# Patient Record
Sex: FEMALE | Race: WHITE
[De-identification: names, ages, dates, MRNs, and addresses within clinical notes are randomized per-mention and may not be internally consistent; named-entity substitution may affect disease eponyms.]

---

## 2018-06-19 ENCOUNTER — HOSPITAL ENCOUNTER (OUTPATIENT)
Dept: HOSPITAL 68 - ERH | Age: 68
LOS: 1 days | End: 2018-06-20
Attending: SURGERY
Payer: COMMERCIAL

## 2018-06-19 VITALS — BODY MASS INDEX: 30.53 KG/M2 | HEIGHT: 66 IN | WEIGHT: 190 LBS

## 2018-06-19 DIAGNOSIS — I10: ICD-10-CM

## 2018-06-19 DIAGNOSIS — K21.9: ICD-10-CM

## 2018-06-19 DIAGNOSIS — K80.10: Primary | ICD-10-CM

## 2018-06-19 LAB
ABSOLUTE GRANULOCYTE CT: 5.2 /CUMM (ref 1.4–6.5)
BASOPHILS # BLD: 0.1 /CUMM (ref 0–0.2)
BASOPHILS NFR BLD: 0.7 % (ref 0–2)
EOSINOPHIL # BLD: 0.2 /CUMM (ref 0–0.7)
EOSINOPHIL NFR BLD: 2.5 % (ref 0–5)
ERYTHROCYTE [DISTWIDTH] IN BLOOD BY AUTOMATED COUNT: 13.5 % (ref 11.5–14.5)
GRANULOCYTES NFR BLD: 62.2 % (ref 42.2–75.2)
HCT VFR BLD CALC: 43.1 % (ref 37–47)
LYMPHOCYTES # BLD: 2.3 /CUMM (ref 1.2–3.4)
MCH RBC QN AUTO: 30 PG (ref 27–31)
MCHC RBC AUTO-ENTMCNC: 33.4 G/DL (ref 33–37)
MCV RBC AUTO: 89.8 FL (ref 81–99)
MONOCYTES # BLD: 0.6 /CUMM (ref 0.1–0.6)
PLATELET # BLD: 247 /CUMM (ref 130–400)
PMV BLD AUTO: 8.5 FL (ref 7.4–10.4)
RED BLOOD CELL CT: 4.8 /CUMM (ref 4.2–5.4)
WBC # BLD AUTO: 8.3 /CUMM (ref 4.8–10.8)

## 2018-06-19 PROCEDURE — C9290 INJ, BUPIVACAINE LIPOSOME: HCPCS

## 2018-06-19 NOTE — HISTORY & PHYSICAL PRE-OP
Sarah Savage 06/19/18 0876:
General Information and HPI
MD Statement:
I have seen and personally examined BETH HINOJOSA and documented this H&P.
 
The patient is a 67 year old F who presented with a patient stated chief 
complaint of [ABDOMINAL PAIN].
 
Source of Information: patient
Exam Limitations: no limitations
History of Present Illness:
This 67 year old female with past medical history including hypertension and 
gerd, presents with abdominal pain that started earlier today. She reports pain 
across her abdomen, with more severe pain along her right upper side. She denies
history of similar episodes of pain. No fevers / chills / or sweats. No 
shortness of breath. No chest pains. No recent illnesses. She is unaware of a 
personal history of gallstones, but admits her mother had gallstones and 
required her gallbladder removed.
 
Allergies/Medications
Allergies:
Coded Allergies:
nitrofurantoin (From MACROBID) (Mild, HIVES 06/30/16)
phenazopyridine (From PYRIDIUM) (Mild, HIVES 06/30/16)
 
 
Past History
 
Medical History
Neurological: NONE
EENT: NONE
Cardiovascular: hypertension
Respiratory: NONE
Gastrointestinal: GERD
Hepatic: NONE
Renal: NONE
Musculoskeletal: NONE
Psychiatric: NONE
Endocrine: NONE
Blood Disorders: NONE
Cancer(s): NONE
GYN/Reproductive: NONE
 
Surgical History
Pertinent Surgical History: hysterectomy
 
Past Family/Social History
 
Family History
Relations & Conditions if any
MOTHER
  FHx: atrial fibrillation
  FHx: gallstones
FATHER
  FHx: lung cancer
 
 
Psychosocial History
Where Do You Live? Home
Who Do You Live With? spouse
Services at Home None
Primary Language: English
Smoking Status: Former Smoker (5 pack/year history, 50yrs ago)
ETOH Use: occasional use
Illicit Drug Use: denies illicit drug use
 
Employment History
Employment: Employed
Profession/Employer: HR 
 
Review of Systems
Review of Systems:
admits: abdominal pain
 
denies: fevers / chills / sweats, shortness of breath, chest pains, dizziness, 
dysuria
 
Exam & Diagnostic Data
Last 24 Hrs of Vital Signs/I&O
 Vital Signs
 
 
Date Time Temp Pulse Resp B/P B/P Pulse O2 O2 Flow FiO2
 
     Mean Ox Delivery Rate 
 
06/19 2051 96.4 80 18 167/77  98 Room Air  
 
 
 
Physical Exam:
General - alert & oriented x 3. comfortable. no acute distress.
skin - warm, dry, and smooth. no rashes. no jaundice appreciated.
Lungs - clear bilaterally. no w/r/r.
Cardiac - s1s2. reg.
Abdomen - soft. right upper quadrant tenderness appreciated with +murphys sign.
Extremities - warm bilaterally. trace edema bilateral ankles. calves soft and 
nontender. nvi.
Neuro - speech smooth and coordinated. no focal deficits.
Last 24 Hrs of Labs/Arturo:
 Laboratory Tests
 
06/19/18 2232:
Urine Color STRAW, Urine Clarity CLEAR, Urine pH 6.0, Ur Specific Gravity <= 
1.005, Urine Protein NEG, Urine Ketones NEG, Urine Nitrite NEG, Urine Bilirubin 
NEG, Urine Urobilinogen 0.2, Ur Leukocyte Esterase NEG, Ur Microscopic SEDIMENT 
EXAMINED, Urine RBC RARE, Urine WBC RARE, Ur Epithelial Cells RARE, Urine 
Bacteria RARE  H, Urine Hemoglobin TRACE-INTACT, Urine Glucose NEG
 
06/19/18 2131:
Anion Gap 10, Estimated GFR > 60, BUN/Creatinine Ratio 28.8  H, Glucose 107  H, 
Calcium 9.9, Total Bilirubin 0.5, AST 28, ALT 27, Alkaline Phosphatase 47, 
Troponin I < 0.01, Total Protein 7.1, Albumin 4.2, Globulin 2.9, Albumin/
Globulin Ratio 1.4, Amylase 63, Lipase 154, CBC w Diff NO MAN DIFF REQ, RBC 4.80
, MCV 89.8, MCH 30.0, MCHC 33.4, RDW 13.5, MPV 8.5, Gran % 62.2, Lymphocytes % 
27.9, Monocytes % 6.7, Eosinophils % 2.5, Basophils % 0.7, Absolute Granulocytes
5.2, Absolute Lymphocytes 2.3, Absolute Monocytes 0.6, Absolute Eosinophils 0.2,
Absolute Basophils 0.1
 
 
Diagnostic Data
Other Results
EXAM TYPE: CAT - CT ABD & PELVIS W IV CONTRAST
 
EXAMINATION:
CT ABDOMEN AND PELVIS WITH CONTRAST
 
CLINICAL INFORMATION:
Right upper quadrant abdominal pain. Evaluate for cholecystitis.
 
COMPARISON:
MRI abdomen 07/21/2017.
 
TECHNIQUE:
Multidetector volumetric imaging was performed of the abdomen and pelvis
following IV administration of 95 mL of Optiray 320 intravenous contrast.
Sagittal and coronal reformatted images were obtained on the technologist's
workstation.
 
DLP:
512.96 mGy-cm
 
FINDINGS:
 
LUNG BASES: There is bibasilar subsegmental atelectasis. No pleural or
pericardial effusion.
 
LIVER, GALLBLADDER, AND BILIARY TREE: Liver attenuation is homogeneous with
no evidence of a discrete hepatic parenchymal mass. Grossly no intrahepatic
or extrahepatic biliary ductal dilatation. The gallbladder is distended with
a fundal diameter of 4.8 cm. There is suspected to be mild gallbladder wall
thickening for instance best illustrated on axial image 34 of 103 series 2. A
few ill-defined calculi are visualized layering within the gallbladder neck.
 
 
PANCREAS: Unremarkable.
 
SPLEEN: Unremarkable.
 
ADRENAL GLANDS: Unremarkable.
 
KIDNEYS AND URETERS: Kidneys demonstrate symmetric corticomedullary
enhancement characteristics. There is no discrete renal parenchymal mass. No
abnormal perinephric inflammation or collection. No hydronephrosis. No
abnormal mass or calcification is visualized along the expected course of the
right or left ureter.
 
BLADDER: Unremarkable.
 
GASTROINTESTINAL TRACT: There is a small sliding hiatal hernia. Stomach is
physiologically distended with ingested material. Small bowel is
unremarkable. Grossly no evidence of obstruction. No free intraperitoneal air
or fluid. There is slightly increased attenuation within the mesenteric fat
that appears grossly stable when compared to prior imaging as well as a few
scattered nonspecific mesenteric lymph nodes. The appendix is normal. A few
diverticula are visualized primarily within the distal descending and sigmoid
colon. No evidence of acute diverticulitis.
 
ABDOMINAL WALL: No significant hernia is appreciated.
 
VASCULAR: The abdominal aorta and inferior vena cava are grossly unremarkable.
 
PELVIC VISCERA: The uterus is not visualized. No abnormal adnexal mass.
 
OSSEOUS STRUCTURES: There is no acute osseous finding. No evidence of acute
fracture and no spinal subluxation.
 
IMPRESSION:
There are a few ill-defined calculi that are layering within the gallbladder
neck and the gallbladder is grossly distended with a fundal diameter of 4.8
cm. There is suspected gallbladder wall thickening that supports the clinical
suspicion of acute cholecystitis. The sensitivity and specificity of CT for
the identification of acute cholecystitis is however relatively poor
therefore a right upper quadrant ultrasound is recommended for better
anatomic characterization.
 
Otherwise stable examination. Specifically the appearance of the mesenteric
fat with a few scattered nonspecific mesenteric lymph nodes remains unchanged
when compared to prior imaging. A few scattered diverticula are visualized
within the descending and sigmoid colon. No evidence of acute diverticulitis.
 
DICTATED BY: Cindy GARAY,Cornelius BUENO 
DATE/TIME DICTATED:06/19/18 / 2226
:RAD.FAY 
DATE/TIME TRANSCRIBED:06/19/18 / 2226
 
 
Assessment/Plan
Assessment/Plan:
This 67 year old female with history of htn, and gerd, presents with acute 
calculous cholecystitis
 
npo after midnight
pain control as needed
iv unasyn q6
protonix - gi ppx
hep sc post-op tomorrow
losartan / hctz ordered
OR tomorrow for laparoscopic cholecystectomy by 
observation status given possibility of discharge to home after surgery
 
As Ranked By This Provider
Problem List:
 1. Cholecystitis
 
 2. GERD (gastroesophageal reflux disease)
 
 3. Hypertension
 
 4. Gallstones
 
Copies To:
Rama GARAY,Chemo Rodriguez Cornell OLIVERTimoteo 06/25/18 9541:
General Information and HPI
 
Allergies/Medications
Home Med list
Ascorbic Acid (Vitamin C) 500 MG CAPSULE.ER   1 CAP PO DAILY SUPPLEMENT  (
Reported)
Aspirin (Aspirin*) 81 MG TAB.CHEW   1 TAB PO DAILY HEART  (Reported)
Calcium Carbonate/Vitamin D3 (Calcium 500 + D Tablet) 1 EACH TABLET   1 TAB PO 
BID SUPPL  (Reported)
Lactobacillus Acidophilus (Probiotic) 1 EACH CAPSULE   1 CAP PO D 0800  (
Reported)
Losartan/Hydrochlorothiazide (Losartan-Hctz 50-12.5 MG Tab) 1 EACH TABLET   1 
TAB PO DAILY HTN  (Reported)
Multivitamin (Daily Multiple Vitamin) 1 EACH TABLET   1 TAB PO DAILY SUPPLE  (
Reported)
Oxycodone HCl/Acetaminophen (Percocet 5-325 MG Tablet) 5 MG-325 MG TABLET   1-2 
TAB PO Q4-6 PRN PRN pain any scale
     tylenol alternatively. do not combine.
Vitamin B Complex & Vit C No.3 (Balanced B Complex-Vit C) 1 EACH TABLET.ER   1 
TAB PO BID SUPPLEMENT  (Reported)
 
 
 
Attending MD Review Statement
 
Attending Statement
Attending MD Statement: discuss w/resident/PA/NP, agreed w/resident/PA/NP, 
discussed with family, reviewed images

## 2018-06-19 NOTE — CT SCAN REPORT
EXAMINATION:
CT ABDOMEN AND PELVIS WITH CONTRAST
 
CLINICAL INFORMATION:
Right upper quadrant abdominal pain. Evaluate for cholecystitis.
 
COMPARISON:
MRI abdomen 07/21/2017.
 
TECHNIQUE:
Multidetector volumetric imaging was performed of the abdomen and pelvis
following IV administration of 95 mL of Optiray 320 intravenous contrast.
Sagittal and coronal reformatted images were obtained on the technologist's
workstation.
 
DLP:
512.96 mGy-cm
 
FINDINGS:
 
LUNG BASES: There is bibasilar subsegmental atelectasis. No pleural or
pericardial effusion.
 
LIVER, GALLBLADDER, AND BILIARY TREE: Liver attenuation is homogeneous with
no evidence of a discrete hepatic parenchymal mass. Grossly no intrahepatic
or extrahepatic biliary ductal dilatation. The gallbladder is distended with
a fundal diameter of 4.8 cm. There is suspected to be mild gallbladder wall
thickening for instance best illustrated on axial image 34 of 103 series 2. A
few ill-defined calculi are visualized layering within the gallbladder neck.
 
 
PANCREAS: Unremarkable.
 
SPLEEN: Unremarkable.
 
ADRENAL GLANDS: Unremarkable.
 
KIDNEYS AND URETERS: Kidneys demonstrate symmetric corticomedullary
enhancement characteristics. There is no discrete renal parenchymal mass. No
abnormal perinephric inflammation or collection. No hydronephrosis. No
abnormal mass or calcification is visualized along the expected course of the
right or left ureter.
 
BLADDER: Unremarkable.
 
GASTROINTESTINAL TRACT: There is a small sliding hiatal hernia. Stomach is
physiologically distended with ingested material. Small bowel is
unremarkable. Grossly no evidence of obstruction. No free intraperitoneal air
or fluid. There is slightly increased attenuation within the mesenteric fat
that appears grossly stable when compared to prior imaging as well as a few
scattered nonspecific mesenteric lymph nodes. The appendix is normal. A few
diverticula are visualized primarily within the distal descending and sigmoid
colon. No evidence of acute diverticulitis.
 
ABDOMINAL WALL: No significant hernia is appreciated.
 
VASCULAR: The abdominal aorta and inferior vena cava are grossly unremarkable.
 
PELVIC VISCERA: The uterus is not visualized. No abnormal adnexal mass.
 
OSSEOUS STRUCTURES: There is no acute osseous finding. No evidence of acute
fracture and no spinal subluxation.
 
IMPRESSION:
There are a few ill-defined calculi that are layering within the gallbladder
neck and the gallbladder is grossly distended with a fundal diameter of 4.8
cm. There is suspected gallbladder wall thickening that supports the clinical
suspicion of acute cholecystitis. The sensitivity and specificity of CT for
the identification of acute cholecystitis is however relatively poor
therefore a right upper quadrant ultrasound is recommended for better
anatomic characterization.
 
Otherwise stable examination. Specifically the appearance of the mesenteric
fat with a few scattered nonspecific mesenteric lymph nodes remains unchanged
when compared to prior imaging. A few scattered diverticula are visualized
within the descending and sigmoid colon. No evidence of acute diverticulitis.

## 2018-06-19 NOTE — ED GI/GU/ABDOMINAL COMPLAINT
History of Present Illness
 
General
Chief Complaint: Abdominal Pain/Flank Pain
Stated Complaint: "PAIN ACROSS MY STOMACH"
Source: patient, family, old records
Exam Limitations: no limitations
 
Vital Signs & Intake/Output
Vital Signs & Intake/Output
 Vital Signs
 
 
Date Time Temp Pulse Resp B/P B/P Pulse O2 O2 Flow FiO2
 
     Mean Ox Delivery Rate 
 
2051 96.4 80 18 167/77  98 Room Air  
 
 
 
Allergies
Coded Allergies:
nitrofurantoin (From MACROBID) (Mild, HIVES 16)
phenazopyridine (From PYRIDIUM) (Mild, HIVES 16)
 
Reconcile Medications
Amoxicillin/Potassium Clav (Augmentin 875-125 Tablet) 1 EACH TABLET   1 TAB PO 
BID SINUSITIS
Ascorbic Acid (Vitamin C) 500 MG CAPSULE.ER   1 CAP PO DAILY SUPPLEMENT  (
Reported)
Aspirin (Aspirin*) 81 MG TAB.CHEW   1 TAB PO DAILY HEART  (Reported)
Calcium Carbonate/Vitamin D3 (Calcium 500 + D Tablet) 1 EACH TABLET   1 TAB PO 
BID SUPPL  (Reported)
Lactobacillus Acidophilus (Probiotic) 1 EACH CAPSULE   1 CAP PO D 0800  (
Reported)
Losartan/Hydrochlorothiazide (Losartan-Hctz 50-12.5 MG Tab) 1 EACH TABLET   1 
TAB PO DAILY HTN  (Reported)
Multivitamin (Daily Multiple Vitamin) 1 EACH TABLET   1 TAB PO DAILY SUPPLE  (
Reported)
Sulfamethoxazole/Trimethoprim (Sulfamethoxazole-Tmp Ds Tablet) 1 EACH TABLET   1
TAB PO BID 0800  (Reported)
Vitamin B Complex & Vit C No.3 (Balanced B Complex-Vit C) 1 EACH TABLET.ER   1 
TAB PO BID SUPPLEMENT  (Reported)
 
Triage Note:
PT TO TRIAGE C/O PAIN ACROSS CENTER OF ABD ALL
 DAY, RELIEVED WITH TUMS, BUT RETURNED AFTER EATING
 DINNER. PT STATES AT LUNCH HAD PASTA WITH TOMATOES
 FROM A RESTAURANT. DENIES N/V/D. STATES HAD BM
 TODAY, NORMAL PER PT. DENIES URINARY S/SX.
Triage Nurses Notes Reviewed? yes
Pregnant? N
Is pt currently breastfeeding? No
HPI:
Shortly after having lunch patient developed a pain in her right upper quadrant.
 The pain then began to radiate across to her left upper quadrant.  Patient was 
slightly nauseous however that has resolved.  The pain worsened after eating 
dinner.  Subsequently the pain has left her left upper quadrant and is currently
only in the right upper quadrant.  The pain is also improved however it is still
present.  At its worst the pain was 8 out of 10 and currently is a 4 out of 10. 
She is no longer nauseous.  There are no fevers or chills.  There is no 
diarrhea.  The pain is cramping in nature.
 
Past History
 
Travel History
Traveled to [place] past 21 day No
 
Medical History
Any Pertinent Medical History? see below for history
Neurological: NONE
EENT: NONE
Cardiovascular: hypertension
Respiratory: NONE
Gastrointestinal: NONE
Hepatic: NONE
Renal: NONE
Musculoskeletal: NONE
Psychiatric: NONE
Endocrine: NONE
Blood Disorders: NONE
Cancer(s): NONE
GYN/Reproductive: NONE
 
Surgical History
Surgical History: non-contributory
 
Psychosocial History
What is your primary language English
Tobacco Use: Never used
ETOH Use: occasional use
Illicit Drug Use: denies illicit drug use
 
Family History
Hx Contributory? No
 
Review of Systems
 
Review of Systems
Constitutional:
Reports: no symptoms. 
EENTM:
Reports: no symptoms. 
Respiratory:
Reports: no symptoms. 
Cardiovascular:
Reports: no symptoms. 
GI:
Reports: see HPI, abdominal pain. 
Genitourinary:
Reports: no symptoms. 
Musculoskeletal:
Reports: no symptoms. 
Skin:
Reports: no symptoms. 
Neurological/Psychological:
Reports: no symptoms. 
Hematologic/Endocrine:
Reports: no symptoms. 
Immunologic/Allergic:
Reports: no symptoms. 
All Other Systems: Reviewed and Negative
 
Physical Exam
 
Physical Exam
General Appearance: well developed/nourished, alert, awake, anxious, mild 
distress
Head: atraumatic, normal appearance
Eyes:
Bilateral: PERRL, EOMI. 
Ears, Nose, Throat, Mouth: hearing grossly normal, moist mucous membrane
Neck: normal inspection, supple, full range of motion
Respiratory: normal breath sounds, chest non-tender, no respiratory distress, 
lungs clear
Cardiovascular: regular rate/rhythm, normal peripheral pulses
Gastrointestinal: normal bowel sounds, soft, no organomegaly, tenderness (RUQ), 
POSITIVE LUIS'S
Back: normal inspection, normal range of motion
Extremities: normal range of motion
Neurologic/Psych: no motor/sensory deficits, awake, alert, oriented x 3, normal 
gait, normal mood/affect
Skin: intact, normal color, warm/dry
 
Core Measures
ACS in differential dx? No
Sepsis Present: No
Sepsis Focused Exam Completed? No
 
Progress
Differential Diagnosis: AMI, biliary colic, cholecystitis, gastritis, hepatitis,
ischemic bowel, inflamm bowel dis, pancreatitis
Plan of Care:
 Orders
 
 
Procedure Date/time Status
 
URINALYSIS 2058 Complete
 
TROPONIN LEVEL 2058 Complete
 
LIPASE 2058 Complete
 
COMPREHENSIVE METABOLIC PANEL 2058 Complete
 
CBC WITHOUT DIFFERENTIAL 2058 Complete
 
AMYLASE 2058 Complete
 
EKG 2058 Active
 
 
 Laboratory Tests
 
 
18 2232:
Urine Color STRAW, Urine Clarity CLEAR, Urine pH 6.0, Ur Specific Gravity <= 
1.005, Urine Protein NEG, Urine Ketones NEG, Urine Nitrite NEG, Urine Bilirubin 
NEG, Urine Urobilinogen 0.2, Ur Leukocyte Esterase NEG, Ur Microscopic SEDIMENT 
EXAMINED, Urine RBC RARE, Urine WBC RARE, Ur Epithelial Cells RARE, Urine 
Bacteria RARE  H, Urine Hemoglobin TRACE-INTACT, Urine Glucose NEG
 
18 2131:
Anion Gap 10, Estimated GFR > 60, BUN/Creatinine Ratio 28.8  H, Glucose 107  H, 
Calcium 9.9, Total Bilirubin 0.5, AST 28, ALT 27, Alkaline Phosphatase 47, 
Troponin I < 0.01, Total Protein 7.1, Albumin 4.2, Globulin 2.9, Albumin/
Globulin Ratio 1.4, Amylase 63, Lipase 154, CBC w Diff NO MAN DIFF REQ, RBC 4.80
, MCV 89.8, MCH 30.0, MCHC 33.4, RDW 13.5, MPV 8.5, Gran % 62.2, Lymphocytes % 
27.9, Monocytes % 6.7, Eosinophils % 2.5, Basophils % 0.7, Absolute Granulocytes
5.2, Absolute Lymphocytes 2.3, Absolute Monocytes 0.6, Absolute Eosinophils 0.2,
Absolute Basophils 0.1
 
Diagnostic Imaging:
Viewed by Me: CT Scan.  Discussed w/RAD: CT Scan. 
Radiology Impression: PATIENT: BETH HINOJOSA  MEDICAL RECORD NO: [id number] PRESENT
AGE: 67  PATIENT ACCOUNT NO: [id number] : [birth year]  LOCATION: [facility] ORDERING 
PHYSICIAN: Meek Duke MD     SERVICE DATE:  EXAM TYPE: CAT 
- CT ABD & PELVIS W IV CONTRAST EXAMINATION: CT ABDOMEN AND PELVIS WITH CONTRAST
CLINICAL INFORMATION: Right upper quadrant abdominal pain. Evaluate for 
cholecystitis. COMPARISON: MRI abdomen 2017. TECHNIQUE: Multidetector 
volumetric imaging was performed of the abdomen and pelvis following IV 
administration of 95 mL of Optiray 320 intravenous contrast. Sagittal and 
coronal reformatted images were obtained on the technologist's workstation. DLP:
512.96 mGy-cm FINDINGS: LUNG BASES: There is bibasilar subsegmental atelectasis.
No pleural or pericardial effusion. LIVER, GALLBLADDER, AND BILIARY TREE: Liver 
attenuation is homogeneous with no evidence of a discrete hepatic parenchymal 
mass. Grossly no intrahepatic or extrahepatic biliary ductal dilatation. The 
gallbladder is distended with a fundal diameter of 4.8 cm. There is suspected to
be mild gallbladder wall thickening for instance best illustrated on axial image
34 of 103 series 2. A few ill-defined calculi are visualized layering within the
gallbladder neck. PANCREAS: Unremarkable. SPLEEN: Unremarkable. ADRENAL GLANDS: 
Unremarkable. KIDNEYS AND URETERS: Kidneys demonstrate symmetric 
corticomedullary enhancement characteristics. There is no discrete renal 
parenchymal mass. No abnormal perinephric inflammation or collection. No 
hydronephrosis. No abnormal mass or calcification is visualized along the 
expected course of the right or left ureter. BLADDER: Unremarkable. 
GASTROINTESTINAL TRACT: There is a small sliding hiatal hernia. Stomach is 
physiologically distended with ingested material. Small bowel is unremarkable. 
Grossly no evidence of obstruction. No free intraperitoneal air or fluid. There 
is slightly increased attenuation within the mesenteric fat that appears grossly
stable when compared to prior imaging as well as a few scattered nonspecific 
mesenteric lymph nodes. The appendix is normal. A few diverticula are visualized
primarily within the distal descending and sigmoid colon. No evidence of acute 
diverticulitis. ABDOMINAL WALL: No significant hernia is appreciated. VASCULAR: 
The abdominal aorta and inferior vena cava are grossly unremarkable. PELVIC 
VISCERA: The uterus is not visualized. No abnormal adnexal mass. OSSEOUS 
STRUCTURES: There is no acute osseous finding. No evidence of acute fracture and
no spinal subluxation. IMPRESSION: There are a few ill-defined calculi that are 
layering within the gallbladder neck and the gallbladder is grossly distended 
with a fundal diameter of 4.8 cm. There is suspected gallbladder wall thickening
that supports the clinical suspicion of acute cholecystitis. The sensitivity and
specificity of CT for the identification of acute cholecystitis is however 
relatively poor therefore a right upper quadrant ultrasound is recommended for 
better anatomic characterization. Otherwise stable examination. Specifically the
appearance of the mesenteric fat with a few scattered nonspecific mesenteric 
lymph nodes remains unchanged when compared to prior imaging. A few scattered 
diverticula are visualized within the descending and sigmoid colon. No evidence 
of acute diverticulitis. DICTATED BY: Cindy GARAY,Cornelius BUENO  DATE/TIME DICTATED:
18 :RAD.FAY  DATE/TIME TRANSCRIBED:18
CONFIDENTIAL, DO NOT COPY WITHOUT APPROPRIATE AUTHORIZATION.  <Electronically 
signed in Other Vendor System>                                                  
                                     SIGNED BY: Cindy GARAY,Cornelius BUENO 18 
1258
Initial ED EKG: NSR, LVH, nonspecific ST T wave chg
Prior EKG: unchanged
Comments:
Patient is pain-free after IV Toradol.
 
On reexamination patient is  in the right upper quadrant with 
positive Luis sign.  Discussed with Dr. Warren.  Patient will placed in on 
his and will have a left Cholecystectomy tomorrow.
 
Departure
 
Departure
Disposition: STILL A PATIENT
Condition: Stable
Clinical Impression
Primary Impression: Cholecystitis
Referrals:
Rama GARAY,Chemo ORTIZ (PCP/Family)
 
Departure Forms:
Customer Survey
General Discharge Information
 
Observation Note
Spoke With:
Timoteo Rodriguez DO
Physician Advisor Notified: EVELIN GARAY,MEEK ARREOLA
Place Patient In: Non-ED OBS Care Area
Rationale for Observation:
My rational for observation is as follows [PAIN CONTROL, LAP CASSANDRA].

## 2018-06-20 VITALS — SYSTOLIC BLOOD PRESSURE: 147 MMHG | DIASTOLIC BLOOD PRESSURE: 68 MMHG

## 2018-06-20 NOTE — PATIENT DISCHARGE INSTRUCTIONS
Discharge Instructions
 
General Discharge Information
You were seen/treated for:
gallstones, acute cholecystitis
You had these procedures:
laparoscopic cholecystectomy (6/20/18)
Watch for these problems:
fever>101.3, increased pain, redness/swelling/drainage, dizziness, shortness of 
breath, chest pains
No bath, but you may shower: Yes
Other wound care:
incisions closed with skin glue. ok to shower. keep incisions clean & dry.
 
Diet
Continue normal diet: Yes
Recommended Diet: Low Fat
 
Activity
Full Activity/No Limits: No
Activity Self Limited: Yes
Pounds, do NOT lift more than: 10
Other activity limits:
no heavy lifting. no strenuous activity.
 
Acute Coronary Syndrome
 
Inclusion Criteria
At DC or during hospital stay patient has or had the following:
ACS DIAGNOSIS No
 
Discharge Core Measures
Meds if any: Prescribed or Continued at Discharge
Meds if any: NOT Prescribed or Continued at Discharge
 
Congestive Heart Failure
 
Inclusion Criteria
At DC or during hospital stay patient has or had the following:
CHF DIAGNOSIS No
 
Discharge Core Measures
Meds if any: Prescribed or Continued at Discharge
Meds if any: NOT Prescribed or Continued at Discharge
 
Cerebrovascular accident
 
Inclusion Criteria
At DC or during hospital stay patient has or had the following:
CVA/TIA Diagnosis No
 
Discharge Core Measures
Meds if any: Prescribed or Continued at Discharge
Meds if any: NOT Prescribed or Continued at Discharge
 
Venous thromboembolism
 
Inclusion Criteria
VTE Diagnosis No
VTE Type NONE
VTE Confirmed by (Test) NONE
 
Discharge Core Measures
- Per Current guidelines, there needs to be overlap
- treatment for the first 5 days of Warfarin therapy.
- If discharged on Warfarin prior to 5 days of
- overlap therapy, the patient will need to be
- assessed for post discharge needs including
- *Post discharge parental anticoagulation
- *Warfarin and/or parental anticoagulation education
- *Follow up date to check INR post discharge
At least 5 days overlap therapy as Inpatient No
Meds if any: Prescribed or Continued at Discharge
Note: Overlap Therapy is Warfarin and Anticoagulant
Meds if any: NOT Prescribed or Continued at Discharge

## 2018-06-20 NOTE — SURG SHORT-STAY <48HRS DIS SUM
Visit Information
 
Visit Dates
Admission Date:
06/19/18
 
Discharge Date:
6/20/18
 
 
Surgical Short Stay DC Summary
Admission Diagnosis:
acute calculous cholecystitis
Final Diagnosis:
same as above, s/p
 
laparoscopic cholecystectomy (6/20/18)
Procedure(s):
laparoscopic cholecystectomy (6/20/18)
Summary/Significant Findings:
Presented to the ED on 6/19/18 with abdominal pain, and was found to have acute 
calculous cholecystitis by CT scan. She was started on iv unasyn and placed into
observation status. She was taken to the OR on 6/20/18 by  for a 
laparoscopic cholecystectomy. She was discharged to home once her pain was 
controlled and she was tolerating a diet.
Condition at Discharge:
stable
Discharge Disposition: home or self care
Discharge instructions provided to patient/family: Yes
Post discharge follow-up plan:
2 week follow up appointment with 
Copies to:
Rama GARAY,Chemo ORTIZ

## 2018-06-26 NOTE — OPERATIVE REPORT
Operative/Inv Procedure Report
Surgery Date: 06/20/18
Name of Procedure:
laparoscopic cholecystectomy
Pre-Operative Diagnosis:
cholecystitis
 
Post-Operative Diagnosis:
as above
Estimated Blood Loss: less than 50ml
Surgeon/Assistant:
Amanuel Flores MD
 
 
Anesthesia: general endotracheal tube
IV Fluids:
1Lns
Implants:
none
Urine Output:
not monitored
Drains:
none
Specimens:
gallbladder
Microbiology:
none
Tourniquet:
none
Complications:
none
Condition:
improved and transfered to PACU
Operative Indication:
cholecystitis
 
Operative/Procedure Note
Note:
after informed consent was obtained with all the risks and benefits of the 
procedure explained in full detail the patient was taken to the operating room 
and placed supine on the operating room table. once on the table while she was 
still awake a surgical time our was performed. then the general endotracheal 
anesthesia was induced. the patient was then prepped with a chlorhexidine based 
solution which was allowed to dry for 3 minutes and then a sterile drape placed.
A surgical pause was then performed. attention was then turned to the umbilicus 
where a 1.5cm transverse incision was made just above the umbilicus with a 15 
blade. then with electrocautery, dissection to the fascia was performed and a 
kocher clamp used to grasp the umbilical stalk. then an incision was made in the
linea alba measureing approximately a centimeter with the electrocautery, and 
the peritoneum breached with a sabrina clamp. a loose figure of 8 0 Vicryl suture 
was then placed in the fascia to be used as a stay stitch and then later for 
closing. a 10mm jc was placed, secured and insufflation to 15mmHg was 
obtained. then under direct vision a 11mm port was placed subxyphoid, and then 2
5mm ports in the RUQ. these were all without complication and no bleeding noted.
then the dome of the gallbladder was grasped with a wavy grasper and retracted 
up and over the liver. there were some adhession of omentum and transverse colon
to the gallbladder which were taken down with electrcautery hook. then with 
blunt and electrocautery dissection, the fundus was exposed and grasped. this 
was retracted and then the cystic duct and artery dissected free from the 
surrounding fibrous tissue which was dense and very fatty. the critical view of 
safety was obtained, and then 2 10mm clips were placed proximally and 1 distally
on both of the structures. then they were transected and the gallbladder 
carefully removed from the liver bed. hemostasis was maintained. the gallbladder
was then placed in an endocatch bag, the liver checked for hemostasis again and 
the abdomen thrououghly washed out with suction and water. the subxyphoid and 
RUQ ports were removed under direct vision and hemostasis noted, insufflation 
was let down, the umbilical jc was removed, then gallbadder removed, and 
then the 0 vicryl was tied down and the fascia closed. 4-0 monocryl was used for
skin closure, and then skin glue as a dressing. 
 
all needle and sponge counts were correct, and I was scrubbed for the entire 
case. 
 
patient was transfered to the PACU without incident. 
Findings:
inflammed gallbladder
Discharge Disposition: PACU
CC:
Mark GARAY,Amanuel

## 2018-08-08 ENCOUNTER — HOSPITAL ENCOUNTER (EMERGENCY)
Dept: HOSPITAL 68 - ERH | Age: 68
End: 2018-08-08
Payer: COMMERCIAL

## 2018-08-08 VITALS — SYSTOLIC BLOOD PRESSURE: 138 MMHG | DIASTOLIC BLOOD PRESSURE: 72 MMHG

## 2018-08-08 DIAGNOSIS — H83.09: Primary | ICD-10-CM

## 2018-08-08 DIAGNOSIS — R42: ICD-10-CM

## 2018-08-08 DIAGNOSIS — R51: ICD-10-CM

## 2018-08-08 DIAGNOSIS — H81.10: ICD-10-CM

## 2018-08-08 LAB
ABSOLUTE GRANULOCYTE CT: 5.2 /CUMM (ref 1.4–6.5)
BASOPHILS # BLD: 0 /CUMM (ref 0–0.2)
BASOPHILS NFR BLD: 0.4 % (ref 0–2)
EOSINOPHIL # BLD: 0.1 /CUMM (ref 0–0.7)
EOSINOPHIL NFR BLD: 1.4 % (ref 0–5)
ERYTHROCYTE [DISTWIDTH] IN BLOOD BY AUTOMATED COUNT: 13.7 % (ref 11.5–14.5)
GRANULOCYTES NFR BLD: 73.4 % (ref 42.2–75.2)
HCT VFR BLD CALC: 41.2 % (ref 37–47)
LYMPHOCYTES # BLD: 1.4 /CUMM (ref 1.2–3.4)
MCH RBC QN AUTO: 30.7 PG (ref 27–31)
MCHC RBC AUTO-ENTMCNC: 34.3 G/DL (ref 33–37)
MCV RBC AUTO: 89.6 FL (ref 81–99)
MONOCYTES # BLD: 0.4 /CUMM (ref 0.1–0.6)
PLATELET # BLD: 235 /CUMM (ref 130–400)
PMV BLD AUTO: 9 FL (ref 7.4–10.4)
RED BLOOD CELL CT: 4.6 /CUMM (ref 4.2–5.4)
WBC # BLD AUTO: 7.1 /CUMM (ref 4.8–10.8)

## 2018-08-08 PROCEDURE — 86618 LYME DISEASE ANTIBODY: CPT

## 2018-08-08 NOTE — CT SCAN REPORT
EXAMINATION:
CT HEAD WITHOUT CONTRAST
 
CLINICAL INFORMATION:
Vertigo. Presumptive diagnosis of cerebellar infarct.
 
COMPARISON:
None
 
TECHNIQUE:
Contiguous axial imaging was performed from the skull base to vertex without
intravenous administration of contrast.
 
DLP:
618.25 mGy-cm
 
FINDINGS:
There is no evidence of acute intracranial hemorrhage or territorial
infarction. No abnormal mass effect or midline shift is seen. Gray to white
matter differentiation is well preserved. No extra-axial fluid collections
are identified.
 
The ventricles and sulci are mildly enlarged, consistent with involutional
changes. There is no abnormal attenuation within the brain parenchyma. The
osseous structures and soft tissues are normal. The mastoid air cells and
visualized portions of the paranasal sinuses are well aerated.
 
IMPRESSION:
No acute intracranial pathology.